# Patient Record
Sex: MALE | Race: WHITE | NOT HISPANIC OR LATINO | Employment: FULL TIME | URBAN - NONMETROPOLITAN AREA
[De-identification: names, ages, dates, MRNs, and addresses within clinical notes are randomized per-mention and may not be internally consistent; named-entity substitution may affect disease eponyms.]

---

## 2024-11-15 ENCOUNTER — OFFICE VISIT (OUTPATIENT)
Dept: INTERNAL MEDICINE | Facility: CLINIC | Age: 39
End: 2024-11-15

## 2024-11-15 VITALS
BODY MASS INDEX: 27.32 KG/M2 | TEMPERATURE: 98.7 F | WEIGHT: 164 LBS | OXYGEN SATURATION: 98 % | HEIGHT: 65 IN | DIASTOLIC BLOOD PRESSURE: 80 MMHG | HEART RATE: 81 BPM | SYSTOLIC BLOOD PRESSURE: 130 MMHG

## 2024-11-15 DIAGNOSIS — K40.90 LEFT INGUINAL HERNIA: Primary | ICD-10-CM

## 2024-11-15 NOTE — PROGRESS NOTES
"Chief Complaint   Patient presents with    Establish Care    Hernia     Patient reports hernia near groin area         History:  Basil Fuentes is a 39 y.o. male who presents today for evaluation of the above problems.      HPI  History of Present Illness  The patient presents for evaluation of a left-sided groin hernia.    He has been living with this hernia for several years and now wishes to address it. The hernia is large and he believes it can be manually reduced. He experiences mild pain and soreness, which varies depending on his activities. 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    He reports no pain in the testicle itself and has no other medical conditions. His decision to seek treatment at this time is due to financial considerations.  He was able to save up enough money to have the hernia treated.  He called to see a surgeon but was instructed to have a referral from his PCP.  He did not have a PCP and presented here to establish care.    SOCIAL HISTORY  He drinks alcohol occasionally.        Social History     Socioeconomic History    Marital status: Single   Tobacco Use    Smoking status: Former     Current packs/day: 0.00     Types: Cigarettes     Quit date:      Years since quittin.8    Smokeless tobacco: Former     Quit date:    Substance and Sexual Activity    Alcohol use: Yes     Comment: occas-2 drinks per week    Drug use: Never    Sexual activity: Defer       ROS:  Review of Systems  As above  No current outpatient medications on file.    No results found for: \"GLUCOSE\", \"BUN\", \"CREATININE\", \"NA\", \"K\", \"CL\", \"CALCIUM\", \"PROTEINTOT\", \"ALBUMIN\", \"ALT\", \"AST\", \"ALKPHOS\", \"BILITOT\", \"GLOB\", \"AGRATIO\", \"BCR\", \"ANIONGAP\", \"EGFR\"    No results found for: \"WBC\", \"RBC\", \"HGB\", \"HCT\", \"MCV\", \"MCH\", \"MCHC\", \"RDW\", \"RDWSD\", \"MPV\", \"PLT\", " "\"NEUTRORELPCT\", \"LYMPHORELPCT\", \"MONORELPCT\", \"EOSRELPCT\", \"BASORELPCT\", \"AUTOIGPER\", \"NEUTROABS\", \"LYMPHSABS\", \"MONOSABS\", \"EOSABS\", \"BASOSABS\", \"AUTOIGNUM\", \"NRBC\"      OBJECTIVE:  Visit Vitals  /80 (BP Location: Left arm, Patient Position: Sitting, Cuff Size: Adult)   Pulse 81   Temp 98.7 °F (37.1 °C) (Temporal)   Ht 165.1 cm (65\")   Wt 74.4 kg (164 lb)   SpO2 98%   BMI 27.29 kg/m²      Physical Exam  Constitutional:       General: He is not in acute distress.     Appearance: He is well-developed. He is not ill-appearing or toxic-appearing.   HENT:      Head: Normocephalic and atraumatic.   Eyes:      Pupils: Pupils are equal, round, and reactive to light.   Neck:      Thyroid: No thyromegaly.      Trachea: Phonation normal.   Cardiovascular:      Rate and Rhythm: Normal rate and regular rhythm.      Heart sounds: No murmur heard.  Pulmonary:      Effort: Pulmonary effort is normal. No respiratory distress.      Breath sounds: Normal breath sounds. No wheezing or rales.   Genitourinary:      Skin:     Coloration: Skin is not pale.      Findings: No erythema.   Neurological:      Mental Status: He is alert.   Psychiatric:         Behavior: Behavior normal.         Thought Content: Thought content normal.         Judgment: Judgment normal.           Assessment/Plan      Diagnoses and all orders for this visit:    1. Left inguinal hernia (Primary)  -     Ambulatory Referral to General Surgery      Assessment & Plan  1. Left inguinal hernia.  The hernia is notably large and extends into the left scrotum, but it is not causing significant pain. Over-the-counter pain relievers such as ibuprofen or Tylenol were suggested for managing discomfort. A referral to a surgeon was provided for further evaluation and potential surgical intervention.    He was instructed to look into getting insurance as he currently does not have any insurance coverage.    Will have him come back in around 6 months for a physical or sooner " if clinically indicated    Return in about 6 months (around 5/15/2025) for Annual physical.      CARLOS Bennett MD  13:59 CST  11/15/2024   Electronically signed      Patient or patient representative verbalized consent for the use of Ambient Listening during the visit with  ALONA Bennett MD for chart documentation. 11/15/2024  14:16 CST

## 2024-11-27 NOTE — PROGRESS NOTES
"  [facility] History and Physical  Basil Fuentes  MRN: [id number]  Age: 39 y.o. male     YOB: 1985    Primary   Problem      - Left groin bulge    SUBJECTIVE  History  of Presenting Illness   Patient is a 39 y.o. male with no pertinent past medical history, presenting for evaluation of left groin bulge. Recently saw Dr. Moore to establish care and have hernia evaluated, and was subsequently referred to our group for surgical evaluation. He is currently taking no prescription medications and he has never had surgery before.         Past Medical History     Past Medical History:  No pertinent past medical history.    PCP: ALONA Bennett MD    Past Surgical History:  No prior surgeries.    Family History:  Patient lost his parents when he was an adolescent, is unaware of details regarding family history.    Social History:  Social History     Tobacco Use    Smoking status: Former     Current packs/day: 0.00     Types: Cigarettes     Quit date:      Years since quittin.9    Smokeless tobacco: Former     Quit date:    Substance Use Topics    Alcohol use: Yes     Comment: occas-2 drinks per week       Allergies:  No Known Allergies    Medications:  No current outpatient medications        Review of Systems   He otherwise denies lightheadedness, dizziness, fainting, passing out, headache, nausea, vomiting, chest pain, palpitations, shortness of breath, coughing, wheezing, heart burn, reflux, skin lesions, unintended weight loss/gain, sensitivity to heat/cold, changes in sensation, changes in vision/hearing/smell/taste, myalgias, arthralgias, and has no other acute complaints or concerning symptoms to report at this time.      Physical Examination   There were no vitals filed for this visit.    Estimated body mass index is 27.29 kg/m² as calculated from the following:    Height as of 11/15/24: 165.1 cm (65\").    Weight as of 11/15/24: 74.4 kg (164 " lb).  PREVIOUS WEIGHTS:   Wt Readings from Last 5 Encounters:   11/15/24 74.4 kg (164 lb)       Physical Examination:  Gen: awake, alert, sitting up on table in no acute distress  HEENT: NCAT, EOMI, anicteric sclerae  CV: RRR, normotensive  Resp: nonlabored on room air, sats appropriate  Abd: soft, nontender, nondistended   : Large left groin bulge which descends into the left hemiscrotum; reducible when patient is supine, has tussive impulse; small tussive impulse on the right may have weakness of ring that location  MSK: moves all four, no c/c/e  Neuro: no focal deficits, cranial nerves grossly intact  Psy:  appropriate, cooperative      Data Review   No recent labs or imaging to review at this time.     Problem List     Patient Active Problem List   Diagnosis    Left inguinal hernia        Assessment/Plan   Basil is a 39 y.o. male with a left inguinal hernia who is desiring repair.  Given the size of the hernia, and the fact that it is symptomatic, I believe it would benefit from repair, and I discussed minimally invasive inguinal hernia repair with mesh in great detail with the patient.  I reviewed the risk, benefits, and alternative therapies in great detail, and I did recommend robotic hernia repair with mesh, possible bilaterally.  I discussed perioperative expectations in great detail, including a 15 pound lifting restriction for at least 6 weeks following surgery.  He indicates this will not be difficult with his employer.  Given that the patient has some issues with his employer, insurance status, would like to delay repair until after the holiday season.  He was provided with an after visit summary and our phone number, and he will give us a call when he wants to schedule an operation.    Smoking cessation: Former smoker - counseled to continue cessation  BMI is >= 25 and <30. (Overweight) The following options were offered after discussion;: exercise counseling/recommendations and nutrition  counseling/recommendations.  Was counseled to avoid weight gain.  Med rec complete: yes  VTE: VTE PPX is not indicated.    Time Spent: I spent 30 minutes caring for Basil Fuentes on this date of service. This time includes time, independent of any procedures, spent by me in the following activities: preparing for the clinic visit, reviewing tests, obtaining and/or reviewing a separately obtained history, performing a medically appropriate examination and/or evaluation, counseling and educating the patient/family/caregiver, ordering medications, tests, or procedures, and documenting information in the medical record.     Weston Martínez MD  11/27/2024   08:10 CST

## 2024-12-03 ENCOUNTER — OFFICE VISIT (OUTPATIENT)
Dept: SURGERY | Facility: CLINIC | Age: 39
End: 2024-12-03
Payer: MEDICAID

## 2024-12-03 VITALS
BODY MASS INDEX: 27.26 KG/M2 | DIASTOLIC BLOOD PRESSURE: 78 MMHG | SYSTOLIC BLOOD PRESSURE: 125 MMHG | HEIGHT: 66 IN | OXYGEN SATURATION: 98 % | WEIGHT: 169.6 LBS | HEART RATE: 66 BPM

## 2024-12-03 DIAGNOSIS — K40.90 LEFT INGUINAL HERNIA: Primary | ICD-10-CM

## 2024-12-03 NOTE — LETTER
December 3, 2024     ALONA Bennett MD  [address]  [address]  [place] [address]    Patient: Basil Fuentes   YOB: 1985   Date of Visit: 12/3/2024     Dear ALONA Bennett MD:       I have seen Basil Fuentes in my clinic today for evaluation of his left inguinal hernia.  Given the size of the hernia, and his symptoms, I do believe he would benefit from repair in a minimally invasive fashion.  I discussed this with him in great detail.he is going to try to work on his employment situation, as well as his insurance, and he will contact us to schedule surgery in the near future once these issues are resolved.     Should you have any questions or concerns about his care, please feel free to reach out.  Thank you very much for this referral, and for involving me in Basil's care.        Sincerely,        Weston Martínez MD        CC: Weston Yang MD  12/03/24 0843  Sign when Signing Visit    [facility] History and Physical  Basil Fuentes  MRN: [id number]  Age: 39 y.o. male     YOB: 1985    Primary   Problem      - Left groin bulge    SUBJECTIVE  History  of Presenting Illness   Patient is a 39 y.o. male with no pertinent past medical history, presenting for evaluation of left groin bulge. Recently saw Dr. Moore to establish care and have hernia evaluated, and was subsequently referred to our group for surgical evaluation. He is currently taking no prescription medications and he has never had surgery before.         Past Medical History     Past Medical History:  No pertinent past medical history.    PCP: ALONA Bennett MD    Past Surgical History:  No prior surgeries.    Family History:  Patient lost his parents when he was an adolescent, is unaware of details regarding family history.    Social History:  Social History     Tobacco Use   • Smoking status: Former     Current packs/day: 0.00      "Types: Cigarettes     Quit date: 2013     Years since quittin.9   • Smokeless tobacco: Former     Quit date: 2019   Substance Use Topics   • Alcohol use: Yes     Comment: occas-2 drinks per week       Allergies:  No Known Allergies    Medications:  No current outpatient medications        Review of Systems   He otherwise denies lightheadedness, dizziness, fainting, passing out, headache, nausea, vomiting, chest pain, palpitations, shortness of breath, coughing, wheezing, heart burn, reflux, skin lesions, unintended weight loss/gain, sensitivity to heat/cold, changes in sensation, changes in vision/hearing/smell/taste, myalgias, arthralgias, and has no other acute complaints or concerning symptoms to report at this time.      Physical Examination   There were no vitals filed for this visit.    Estimated body mass index is 27.29 kg/m² as calculated from the following:    Height as of 11/15/24: 165.1 cm (65\").    Weight as of 11/15/24: 74.4 kg (164 lb).  PREVIOUS WEIGHTS:   Wt Readings from Last 5 Encounters:   11/15/24 74.4 kg (164 lb)       Physical Examination:  Gen: awake, alert, sitting up on table in no acute distress  HEENT: NCAT, EOMI, anicteric sclerae  CV: RRR, normotensive  Resp: nonlabored on room air, sats appropriate  Abd: soft, nontender, nondistended   : Large left groin bulge which descends into the left hemiscrotum; reducible when patient is supine, has tussive impulse; small tussive impulse on the right may have weakness of ring that location  MSK: moves all four, no c/c/e  Neuro: no focal deficits, cranial nerves grossly intact  Psy:  appropriate, cooperative      Data Review   No recent labs or imaging to review at this time.     Problem List     Patient Active Problem List   Diagnosis   • Left inguinal hernia        Assessment/Plan   Basil is a 39 y.o. male with a left inguinal hernia who is desiring repair.  Given the size of the hernia, and the fact that it is symptomatic, I believe it " would benefit from repair, and I discussed minimally invasive inguinal hernia repair with mesh in great detail with the patient.  I reviewed the risk, benefits, and alternative therapies in great detail, and I did recommend robotic hernia repair with mesh, possible bilaterally.  I discussed perioperative expectations in great detail, including a 15 pound lifting restriction for at least 6 weeks following surgery.  He indicates this will not be difficult with his employer.  Given that the patient has some issues with his employer, insurance status, would like to delay repair until after the holiday season.  He was provided with an after visit summary and our phone number, and he will give us a call when he wants to schedule an operation.    Smoking cessation: Former smoker - counseled to continue cessation  BMI is >= 25 and <30. (Overweight) The following options were offered after discussion;: exercise counseling/recommendations and nutrition counseling/recommendations.  Was counseled to avoid weight gain.  Med rec complete: yes  VTE: VTE PPX is not indicated.    Time Spent: I spent 30 minutes caring for Basil Fuentes on this date of service. This time includes time, independent of any procedures, spent by me in the following activities: preparing for the clinic visit, reviewing tests, obtaining and/or reviewing a separately obtained history, performing a medically appropriate examination and/or evaluation, counseling and educating the patient/family/caregiver, ordering medications, tests, or procedures, and documenting information in the medical record.     Weston Martínez MD  11/27/2024   08:10 CST

## 2024-12-03 NOTE — PATIENT INSTRUCTIONS
General Surgery Clinic Discharge Instructions      Basil Alfredo  12/03/24      Diagnosis: left inguinal hernia    Medications/Treatments:   (Take all medications as prescribed by your provider)  N/A      Surgical Plan: Plan for robotic left inguinal hernia repair with mesh, possible bilateral, in near future.       Follow-up: Please call the office to schedule surgery for a date that works for you.  I generally do my robotic cases on Friday afternoons, and any date on a Friday afternoon will work.      Please call our office at [phone] with any issues, questions, or concerns.      Weston Martínez MD  12/3/2024   08:37 CST

## 2024-12-16 DIAGNOSIS — K40.90 LEFT INGUINAL HERNIA: Primary | ICD-10-CM

## 2025-01-16 ENCOUNTER — HOSPITAL ENCOUNTER (OUTPATIENT)
Dept: CT IMAGING | Facility: HOSPITAL | Age: 40
Discharge: HOME | End: 2025-01-16
Admitting: STUDENT IN AN ORGANIZED HEALTH CARE EDUCATION/TRAINING PROGRAM

## 2025-01-16 DIAGNOSIS — K40.90 LEFT INGUINAL HERNIA: ICD-10-CM

## 2025-01-16 PROCEDURE — 25510000001 IOPAMIDOL 61 % SOLUTION: Performed by: STUDENT IN AN ORGANIZED HEALTH CARE EDUCATION/TRAINING PROGRAM

## 2025-01-16 PROCEDURE — 74177 CT ABD & PELVIS W/CONTRAST: CPT

## 2025-01-16 RX ORDER — IOPAMIDOL 612 MG/ML
100 INJECTION, SOLUTION INTRAVASCULAR
Status: COMPLETED | OUTPATIENT
Start: 2025-01-16 | End: 2025-01-16

## 2025-01-16 RX ADMIN — IOPAMIDOL 100 ML: 612 INJECTION, SOLUTION INTRAVENOUS at 08:23

## 2025-01-24 ENCOUNTER — PRE-ADMISSION TESTING (OUTPATIENT)
Dept: PREADMISSION TESTING | Facility: HOSPITAL | Age: 40
End: 2025-01-24

## 2025-01-24 ENCOUNTER — HOSPITAL ENCOUNTER (OUTPATIENT)
Dept: GENERAL RADIOLOGY | Facility: HOSPITAL | Age: 40
Discharge: HOME | End: 2025-01-24

## 2025-01-24 VITALS
DIASTOLIC BLOOD PRESSURE: 84 MMHG | WEIGHT: 173.72 LBS | RESPIRATION RATE: 18 BRPM | HEART RATE: 71 BPM | BODY MASS INDEX: 27.27 KG/M2 | HEIGHT: 67 IN | OXYGEN SATURATION: 99 % | SYSTOLIC BLOOD PRESSURE: 120 MMHG

## 2025-01-24 DIAGNOSIS — K40.90 LEFT INGUINAL HERNIA: ICD-10-CM

## 2025-01-24 LAB
ALBUMIN SERPL-MCNC: 4.3 G/DL (ref 3.5–5.2)
ALBUMIN/GLOB SERPL: 1.7 G/DL
ALP SERPL-CCNC: 64 U/L (ref 39–117)
ALT SERPL W P-5'-P-CCNC: 34 U/L (ref 1–41)
ANION GAP SERPL CALCULATED.3IONS-SCNC: 8 MMOL/L (ref 5–15)
APTT PPP: 24.9 SECONDS (ref 24.5–36)
AST SERPL-CCNC: 25 U/L (ref 1–40)
BASOPHILS # BLD AUTO: 0.06 10*3/MM3 (ref 0–0.2)
BASOPHILS NFR BLD AUTO: 1.4 % (ref 0–1.5)
BILIRUB SERPL-MCNC: 0.3 MG/DL (ref 0–1.2)
BUN SERPL-MCNC: 19 MG/DL (ref 6–20)
BUN/CREAT SERPL: 25.3 (ref 7–25)
CALCIUM SPEC-SCNC: 9 MG/DL (ref 8.6–10.5)
CHLORIDE SERPL-SCNC: 105 MMOL/L (ref 98–107)
CO2 SERPL-SCNC: 26 MMOL/L (ref 22–29)
CREAT SERPL-MCNC: 0.75 MG/DL (ref 0.76–1.27)
DEPRECATED RDW RBC AUTO: 42.3 FL (ref 37–54)
EGFRCR SERPLBLD CKD-EPI 2021: 117.7 ML/MIN/1.73
EOSINOPHIL # BLD AUTO: 0.1 10*3/MM3 (ref 0–0.4)
EOSINOPHIL NFR BLD AUTO: 2.3 % (ref 0.3–6.2)
ERYTHROCYTE [DISTWIDTH] IN BLOOD BY AUTOMATED COUNT: 13.4 % (ref 12.3–15.4)
GLOBULIN UR ELPH-MCNC: 2.6 GM/DL
GLUCOSE SERPL-MCNC: 93 MG/DL (ref 65–99)
HCT VFR BLD AUTO: 43.1 % (ref 37.5–51)
HGB BLD-MCNC: 14.1 G/DL (ref 13–17.7)
IMM GRANULOCYTES # BLD AUTO: 0.01 10*3/MM3 (ref 0–0.05)
IMM GRANULOCYTES NFR BLD AUTO: 0.2 % (ref 0–0.5)
INR PPP: 0.93 (ref 0.91–1.09)
LYMPHOCYTES # BLD AUTO: 1.28 10*3/MM3 (ref 0.7–3.1)
LYMPHOCYTES NFR BLD AUTO: 29.7 % (ref 19.6–45.3)
MCH RBC QN AUTO: 28 PG (ref 26.6–33)
MCHC RBC AUTO-ENTMCNC: 32.7 G/DL (ref 31.5–35.7)
MCV RBC AUTO: 85.7 FL (ref 79–97)
MONOCYTES # BLD AUTO: 0.35 10*3/MM3 (ref 0.1–0.9)
MONOCYTES NFR BLD AUTO: 8.1 % (ref 5–12)
NEUTROPHILS NFR BLD AUTO: 2.51 10*3/MM3 (ref 1.7–7)
NEUTROPHILS NFR BLD AUTO: 58.3 % (ref 42.7–76)
NRBC BLD AUTO-RTO: 0 /100 WBC (ref 0–0.2)
PLATELET # BLD AUTO: 255 10*3/MM3 (ref 140–450)
PMV BLD AUTO: 10 FL (ref 6–12)
POTASSIUM SERPL-SCNC: 4.3 MMOL/L (ref 3.5–5.2)
PROT SERPL-MCNC: 6.9 G/DL (ref 6–8.5)
PROTHROMBIN TIME: 12.9 SECONDS (ref 11.8–14.8)
QT INTERVAL: 382 MS
QTC INTERVAL: 406 MS
RBC # BLD AUTO: 5.03 10*6/MM3 (ref 4.14–5.8)
SODIUM SERPL-SCNC: 139 MMOL/L (ref 136–145)
WBC NRBC COR # BLD AUTO: 4.31 10*3/MM3 (ref 3.4–10.8)

## 2025-01-24 PROCEDURE — 80053 COMPREHEN METABOLIC PANEL: CPT

## 2025-01-24 PROCEDURE — 85025 COMPLETE CBC W/AUTO DIFF WBC: CPT

## 2025-01-24 PROCEDURE — 71045 X-RAY EXAM CHEST 1 VIEW: CPT

## 2025-01-24 PROCEDURE — 85730 THROMBOPLASTIN TIME PARTIAL: CPT

## 2025-01-24 PROCEDURE — 36415 COLL VENOUS BLD VENIPUNCTURE: CPT

## 2025-01-24 PROCEDURE — 85610 PROTHROMBIN TIME: CPT

## 2025-01-24 PROCEDURE — 93005 ELECTROCARDIOGRAM TRACING: CPT

## 2025-01-24 NOTE — DISCHARGE INSTRUCTIONS
Preparing for Surgery  Follow these instructions before the procedure:  Several days or weeks before your procedure      Ask your health care provider about:  Changing or stopping your regular medicines. This is especially important if you are taking diabetes medicines or blood thinners.  Taking medicines such as aspirin and ibuprofen. These medicines can thin your blood. Do not take these medicines unless your health care provider tells you to take them.  Taking over-the-counter medicines, vitamins, herbs, and supplements.    Contact your surgeon if you:  Develop a fever of more than 100.4°F (38°C) or other feelings of illness during the 48 hours before your surgery.  Have symptoms that get worse.  Have questions or concerns about your surgery.  If you are going home the same day of your surgery you will need to arrange for a responsible adult, age 18 years old or older, to drive you home from the hospital and stay with you for 24 hours. Verification of the  will be made prior to any procedure requiring sedation. You may not go home in a taxi or any form of public transportation by yourself.     Day before your procedure    24 hours before your procedure DO NOT drink alcoholic beverages or smoke.  24 hours before your procedure STOP taking Erectile Dysfunction medication (i.e.,Cialis, Viagra)   You may be asked to shower with a germ-killing soap.  Day of your procedure       8 hours before your scheduled arrival time, STOP all food, any dairy products, and full liquids. This includes hard candy, chewing gum or mints. This is extremely important to prevent serious complications.     Up to 2 hours before your scheduled arrival time, you may have clear liquids no cream, powder, or pulp of any kind. Safe options are water, black coffee, plain tea, soda, Gatorade/Powerade, clear broth, apple juice.    2 hours before your scheduled arrival time, STOP drinking clear liquids.    You may need to take another shower  with a germ-killing soap before you leave home in the morning. Do not use perfumes, colognes, or body lotions.  Wear comfortable loose-fitting clothing.  Remove all jewelry including body piercing and rings, dark colored nail polish, and make up prior to arrival at the hospital. Leave all valuables at home.   Bring your hearing aids if you rely on them.  Do not wear contact lenses. If you wear eyeglasses remember to bring a case to store them in while you are in surgery.  Do not use denture adhesives since you will be asked to remove them during your surgery.    You do not need to bring your home medications into the hospital.   Bring your sleep apnea device with you on the day of your surgery (if this applies to you).  If you have an Inspire implant for sleep apnea, please bring the remote with you on the day of surgery.  If you wear portable oxygen, bring it with you.   If you are staying overnight, you may bring a bag of items you may need such as slippers, robe and a change of clothes for your discharge. You may want to leave these items in the car until you are ready for them since your family will take your belongings when you leave the pre-operative area.  Arrive at the hospital as scheduled by the office. You will be asked to arrive 2 hours prior to your surgery time in order to prepare for your procedure.  When you arrive at the hospital  Go to the registration desk located at the main entrance of the hospital.  After registration is completed, you will be given a beeper and a sticker sheet. Take the stickers to Outpatient Surgery and place in the tray at the end of the desk to notify the staff that you have arrived and registered.   Return to the lobby to wait. You are not always called back according to the time of arrival but rather the time your doctor will be ready.  When your beeper lights up and vibrates proceed through the double doors, under the stairs, and a member of the Outpatient Surgery staff  will escort you to your preoperative room.               How to Use Chlorhexidine Before Surgery  Chlorhexidine gluconate (CHG) is a germ-killing (antiseptic) solution that is used to clean the skin. It can get rid of the bacteria that normally live on the skin and can keep them away for about 24 hours. To clean your skin with CHG, you may be given:  A CHG solution to use in the shower or as part of a sponge bath.  A prepackaged cloth that contains CHG.  Cleaning your skin with CHG may help lower the risk for infection:  While you are staying in the intensive care unit of the hospital.  If you have a vascular access, such as a central line, to provide short-term or long-term access to your veins.  If you have a catheter to drain urine from your bladder.  If you are on a ventilator. A ventilator is a machine that helps you breathe by moving air in and out of your lungs.  After surgery.  What are the risks?  Risks of using CHG include:  A skin reaction.  Hearing loss, if CHG gets in your ears and you have a perforated eardrum.  Eye injury, if CHG gets in your eyes and is not rinsed out.  The CHG product catching fire.  Make sure that you avoid smoking and flames after applying CHG to your skin.  Do not use CHG:  If you have a chlorhexidine allergy or have previously reacted to chlorhexidine.  On babies younger than 2 months of age.  How to use CHG solution  Use CHG only as told by your health care provider, and follow the instructions on the label.  Use the full amount of CHG as directed. Usually, this is one bottle.  During a shower    Follow these steps when using CHG solution during a shower (unless your health care provider gives you different instructions):  Start the shower.  Use your normal soap and shampoo to wash your face and hair.  Turn off the shower or move out of the shower stream.  Pour the CHG onto a clean washcloth. Do not use any type of brush or rough-edged sponge.  Starting at your neck, lather  your body down to your toes. Make sure you follow these instructions:  If you will be having surgery, pay special attention to the part of your body where you will be having surgery. Scrub this area for at least 1 minute.  Do not use CHG on your head or face. If the solution gets into your ears or eyes, rinse them well with water.  Avoid your genital area.  Avoid any areas of skin that have broken skin, cuts, or scrapes.  Scrub your back and under your arms. Make sure to wash skin folds.  Let the lather sit on your skin for 1-2 minutes or as long as told by your health care provider.  Thoroughly rinse your entire body in the shower. Make sure that all body creases and crevices are rinsed well.  Dry off with a clean towel. Do not put any substances on your body afterward--such as powder, lotion, or perfume--unless you are told to do so by your health care provider. Only use lotions that are recommended by the .  Put on clean clothes or pajamas.  If it is the night before your surgery, sleep in clean sheets.     During a sponge bath  Follow these steps when using CHG solution during a sponge bath (unless your health care provider gives you different instructions):  Use your normal soap and shampoo to wash your face and hair.  Pour the CHG onto a clean washcloth.  Starting at your neck, lather your body down to your toes. Make sure you follow these instructions:  If you will be having surgery, pay special attention to the part of your body where you will be having surgery. Scrub this area for at least 1 minute.  Do not use CHG on your head or face. If the solution gets into your ears or eyes, rinse them well with water.  Avoid your genital area.  Avoid any areas of skin that have broken skin, cuts, or scrapes.  Scrub your back and under your arms. Make sure to wash skin folds.  Let the lather sit on your skin for 1-2 minutes or as long as told by your health care provider.  Using a different clean, wet  washcloth, thoroughly rinse your entire body. Make sure that all body creases and crevices are rinsed well.  Dry off with a clean towel. Do not put any substances on your body afterward--such as powder, lotion, or perfume--unless you are told to do so by your health care provider. Only use lotions that are recommended by the .  Put on clean clothes or pajamas.  If it is the night before your surgery, sleep in clean sheets.  How to use CHG prepackaged cloths  Only use CHG cloths as told by your health care provider, and follow the instructions on the label.  Use the CHG cloth on clean, dry skin.  Do not use the CHG cloth on your head or face unless your health care provider tells you to.  When washing with the CHG cloth:  Avoid your genital area.  Avoid any areas of skin that have broken skin, cuts, or scrapes.  Before surgery    Follow these steps when using a CHG cloth to clean before surgery (unless your health care provider gives you different instructions):  Using the CHG cloth, vigorously scrub the part of your body where you will be having surgery. Scrub using a back-and-forth motion for 3 minutes. The area on your body should be completely wet with CHG when you are done scrubbing.  Do not rinse. Discard the cloth and let the area air-dry. Do not put any substances on the area afterward, such as powder, lotion, or perfume.  Put on clean clothes or pajamas.  If it is the night before your surgery, sleep in clean sheets.     For general bathing  Follow these steps when using CHG cloths for general bathing (unless your health care provider gives you different instructions).  Use a separate CHG cloth for each area of your body. Make sure you wash between any folds of skin and between your fingers and toes. Wash your body in the following order, switching to a new cloth after each step:  The front of your neck, shoulders, and chest.  Both of your arms, under your arms, and your hands.  Your stomach and  groin area, avoiding the genitals.  Your right leg and foot.  Your left leg and foot.  The back of your neck, your back, and your buttocks.  Do not rinse. Discard the cloth and let the area air-dry. Do not put any substances on your body afterward--such as powder, lotion, or perfume--unless you are told to do so by your health care provider. Only use lotions that are recommended by the .  Put on clean clothes or pajamas.  Contact a health care provider if:  Your skin gets irritated after scrubbing.  You have questions about using your solution or cloth.  You swallow any chlorhexidine. Call your local poison control center (1-479.840.8266 in the [place]  Get help right away if:  Your eyes itch badly, or they become very red or swollen.  Your skin itches badly and is red or swollen.  Your hearing changes.  You have trouble seeing.  You have swelling or tingling in your mouth or throat.  You have trouble breathing.  These symptoms may represent a serious problem that is an emergency. Do not wait to see if the symptoms will go away. Get medical help right away. Call your local emergency services (741 in the [place] Do not drive yourself to the hospital.  Summary  Chlorhexidine gluconate (CHG) is a germ-killing (antiseptic) solution that is used to clean the skin. Cleaning your skin with CHG may help to lower your risk for infection.  You may be given CHG to use for bathing. It may be in a bottle or in a prepackaged cloth to use on your skin. Carefully follow your health care provider's instructions and the instructions on the product label.  Do not use CHG if you have a chlorhexidine allergy.  Contact your health care provider if your skin gets irritated after scrubbing.  This information is not intended to replace advice given to you by your health care provider. Make sure you discuss any questions you have with your health care provider.  Document Revised: 04/17/2023 Document Reviewed: 02/28/2022  Roger  Patient Education © 2023 [employer]

## 2025-01-31 ENCOUNTER — ANESTHESIA EVENT (OUTPATIENT)
Dept: PERIOP | Facility: HOSPITAL | Age: 40
End: 2025-01-31

## 2025-01-31 ENCOUNTER — ANESTHESIA (OUTPATIENT)
Dept: PERIOP | Facility: HOSPITAL | Age: 40
End: 2025-01-31

## 2025-01-31 ENCOUNTER — HOSPITAL ENCOUNTER (OUTPATIENT)
Facility: HOSPITAL | Age: 40
Setting detail: HOSPITAL OUTPATIENT SURGERY
Discharge: HOME | End: 2025-01-31
Attending: STUDENT IN AN ORGANIZED HEALTH CARE EDUCATION/TRAINING PROGRAM | Admitting: STUDENT IN AN ORGANIZED HEALTH CARE EDUCATION/TRAINING PROGRAM

## 2025-01-31 VITALS
RESPIRATION RATE: 16 BRPM | SYSTOLIC BLOOD PRESSURE: 137 MMHG | DIASTOLIC BLOOD PRESSURE: 75 MMHG | HEART RATE: 61 BPM | OXYGEN SATURATION: 97 % | TEMPERATURE: 97.9 F

## 2025-01-31 DIAGNOSIS — K40.90 LEFT INGUINAL HERNIA: ICD-10-CM

## 2025-01-31 PROCEDURE — 25010000002 DEXAMETHASONE PER 1 MG: Performed by: NURSE ANESTHETIST, CERTIFIED REGISTERED

## 2025-01-31 PROCEDURE — 25010000002 MORPHINE SULFATE (PF) 2 MG/ML SOLUTION 1 ML CARTRIDGE: Performed by: STUDENT IN AN ORGANIZED HEALTH CARE EDUCATION/TRAINING PROGRAM

## 2025-01-31 PROCEDURE — S0260 H&P FOR SURGERY: HCPCS | Performed by: STUDENT IN AN ORGANIZED HEALTH CARE EDUCATION/TRAINING PROGRAM

## 2025-01-31 PROCEDURE — 25010000002 MIDAZOLAM PER 1MG: Performed by: ANESTHESIOLOGY

## 2025-01-31 PROCEDURE — 25010000002 GLYCOPYRROLATE 0.4 MG/2ML SOLUTION: Performed by: NURSE ANESTHETIST, CERTIFIED REGISTERED

## 2025-01-31 PROCEDURE — 25010000002 LIDOCAINE 1% - EPINEPHRINE 1:100000 1 %-1:100000 SOLUTION 20 ML VIAL: Performed by: STUDENT IN AN ORGANIZED HEALTH CARE EDUCATION/TRAINING PROGRAM

## 2025-01-31 PROCEDURE — 25010000002 HYDROMORPHONE 1 MG/ML SOLUTION: Performed by: NURSE ANESTHETIST, CERTIFIED REGISTERED

## 2025-01-31 PROCEDURE — 49650 LAP ING HERNIA REPAIR INIT: CPT | Performed by: STUDENT IN AN ORGANIZED HEALTH CARE EDUCATION/TRAINING PROGRAM

## 2025-01-31 PROCEDURE — S2900 ROBOTIC SURGICAL SYSTEM: HCPCS | Performed by: STUDENT IN AN ORGANIZED HEALTH CARE EDUCATION/TRAINING PROGRAM

## 2025-01-31 PROCEDURE — C1781 MESH (IMPLANTABLE): HCPCS | Performed by: STUDENT IN AN ORGANIZED HEALTH CARE EDUCATION/TRAINING PROGRAM

## 2025-01-31 PROCEDURE — 25010000002 BUPIVACAINE 0.5 % SOLUTION 50 ML VIAL: Performed by: STUDENT IN AN ORGANIZED HEALTH CARE EDUCATION/TRAINING PROGRAM

## 2025-01-31 PROCEDURE — 25010000002 BUPIVACAINE (PF) 0.25 % SOLUTION 30 ML VIAL: Performed by: STUDENT IN AN ORGANIZED HEALTH CARE EDUCATION/TRAINING PROGRAM

## 2025-01-31 PROCEDURE — 25010000002 LIDOCAINE 1 % SOLUTION 20 ML VIAL: Performed by: STUDENT IN AN ORGANIZED HEALTH CARE EDUCATION/TRAINING PROGRAM

## 2025-01-31 PROCEDURE — 25010000002 ONDANSETRON PER 1 MG: Performed by: NURSE ANESTHETIST, CERTIFIED REGISTERED

## 2025-01-31 PROCEDURE — 25010000002 DEXAMETHASONE PER 1 MG: Performed by: STUDENT IN AN ORGANIZED HEALTH CARE EDUCATION/TRAINING PROGRAM

## 2025-01-31 PROCEDURE — 25810000003 LACTATED RINGERS PER 1000 ML: Performed by: STUDENT IN AN ORGANIZED HEALTH CARE EDUCATION/TRAINING PROGRAM

## 2025-01-31 PROCEDURE — 25010000002 CEFAZOLIN PER 500 MG: Performed by: STUDENT IN AN ORGANIZED HEALTH CARE EDUCATION/TRAINING PROGRAM

## 2025-01-31 PROCEDURE — 25010000002 FENTANYL CITRATE (PF) 100 MCG/2ML SOLUTION: Performed by: NURSE ANESTHETIST, CERTIFIED REGISTERED

## 2025-01-31 PROCEDURE — 25010000002 PROPOFOL 10 MG/ML EMULSION: Performed by: NURSE ANESTHETIST, CERTIFIED REGISTERED

## 2025-01-31 PROCEDURE — 25010000002 LIDOCAINE PF 2% 2 % SOLUTION: Performed by: NURSE ANESTHETIST, CERTIFIED REGISTERED

## 2025-01-31 DEVICE — IMPLANTABLE DEVICE: Type: IMPLANTABLE DEVICE | Site: ABDOMEN | Status: FUNCTIONAL

## 2025-01-31 DEVICE — ABSORBABLE WOUND CLOSURE DEVICE
Type: IMPLANTABLE DEVICE | Site: ABDOMEN | Status: FUNCTIONAL
Brand: V-LOC 90

## 2025-01-31 RX ORDER — PROPOFOL 10 MG/ML
VIAL (ML) INTRAVENOUS AS NEEDED
Status: DISCONTINUED | OUTPATIENT
Start: 2025-01-31 | End: 2025-01-31 | Stop reason: SURG

## 2025-01-31 RX ORDER — NEOSTIGMINE METHYLSULFATE 5 MG/5 ML
SYRINGE (ML) INTRAVENOUS AS NEEDED
Status: DISCONTINUED | OUTPATIENT
Start: 2025-01-31 | End: 2025-01-31 | Stop reason: SURG

## 2025-01-31 RX ORDER — SODIUM CHLORIDE 0.9 % (FLUSH) 0.9 %
3-10 SYRINGE (ML) INJECTION AS NEEDED
Status: DISCONTINUED | OUTPATIENT
Start: 2025-01-31 | End: 2025-01-31 | Stop reason: HOSPADM

## 2025-01-31 RX ORDER — ACETAMINOPHEN 500 MG
1000 TABLET ORAL ONCE
Status: COMPLETED | OUTPATIENT
Start: 2025-01-31 | End: 2025-01-31

## 2025-01-31 RX ORDER — CEPHALEXIN 500 MG/1
500 CAPSULE ORAL 4 TIMES DAILY
COMMUNITY

## 2025-01-31 RX ORDER — DOCUSATE SODIUM 250 MG
250 CAPSULE ORAL 2 TIMES DAILY PRN
Qty: 90 CAPSULE | Refills: 0 | Status: SHIPPED | OUTPATIENT
Start: 2025-01-31

## 2025-01-31 RX ORDER — ONDANSETRON 2 MG/ML
4 INJECTION INTRAMUSCULAR; INTRAVENOUS ONCE AS NEEDED
Status: DISCONTINUED | OUTPATIENT
Start: 2025-01-31 | End: 2025-01-31 | Stop reason: HOSPADM

## 2025-01-31 RX ORDER — MAGNESIUM HYDROXIDE 1200 MG/15ML
LIQUID ORAL AS NEEDED
Status: DISCONTINUED | OUTPATIENT
Start: 2025-01-31 | End: 2025-01-31 | Stop reason: HOSPADM

## 2025-01-31 RX ORDER — IBUPROFEN 600 MG/1
600 TABLET, FILM COATED ORAL EVERY 6 HOURS PRN
Status: DISCONTINUED | OUTPATIENT
Start: 2025-01-31 | End: 2025-01-31 | Stop reason: HOSPADM

## 2025-01-31 RX ORDER — FLUMAZENIL 0.1 MG/ML
0.2 INJECTION INTRAVENOUS AS NEEDED
Status: DISCONTINUED | OUTPATIENT
Start: 2025-01-31 | End: 2025-01-31 | Stop reason: HOSPADM

## 2025-01-31 RX ORDER — HYDROCODONE BITARTRATE AND ACETAMINOPHEN 7.5; 325 MG/1; MG/1
1 TABLET ORAL ONCE AS NEEDED
Status: DISCONTINUED | OUTPATIENT
Start: 2025-01-31 | End: 2025-01-31 | Stop reason: HOSPADM

## 2025-01-31 RX ORDER — DEXAMETHASONE SODIUM PHOSPHATE 4 MG/ML
INJECTION, SOLUTION INTRA-ARTICULAR; INTRALESIONAL; INTRAMUSCULAR; INTRAVENOUS; SOFT TISSUE AS NEEDED
Status: DISCONTINUED | OUTPATIENT
Start: 2025-01-31 | End: 2025-01-31 | Stop reason: SURG

## 2025-01-31 RX ORDER — FENTANYL CITRATE 50 UG/ML
50 INJECTION, SOLUTION INTRAMUSCULAR; INTRAVENOUS
Status: DISCONTINUED | OUTPATIENT
Start: 2025-01-31 | End: 2025-01-31 | Stop reason: HOSPADM

## 2025-01-31 RX ORDER — NALOXONE HCL 0.4 MG/ML
0.4 VIAL (ML) INJECTION AS NEEDED
Status: DISCONTINUED | OUTPATIENT
Start: 2025-01-31 | End: 2025-01-31 | Stop reason: HOSPADM

## 2025-01-31 RX ORDER — SODIUM CHLORIDE 0.9 % (FLUSH) 0.9 %
10 SYRINGE (ML) INJECTION AS NEEDED
Status: DISCONTINUED | OUTPATIENT
Start: 2025-01-31 | End: 2025-01-31 | Stop reason: HOSPADM

## 2025-01-31 RX ORDER — ROCURONIUM BROMIDE 10 MG/ML
INJECTION, SOLUTION INTRAVENOUS AS NEEDED
Status: DISCONTINUED | OUTPATIENT
Start: 2025-01-31 | End: 2025-01-31 | Stop reason: SURG

## 2025-01-31 RX ORDER — OXYCODONE HYDROCHLORIDE 5 MG/1
5 TABLET ORAL EVERY 6 HOURS PRN
Qty: 20 TABLET | Refills: 0 | Status: SHIPPED | OUTPATIENT
Start: 2025-01-31

## 2025-01-31 RX ORDER — SODIUM CHLORIDE 0.9 % (FLUSH) 0.9 %
10 SYRINGE (ML) INJECTION EVERY 12 HOURS SCHEDULED
Status: DISCONTINUED | OUTPATIENT
Start: 2025-01-31 | End: 2025-01-31 | Stop reason: HOSPADM

## 2025-01-31 RX ORDER — HYDROCODONE BITARTRATE AND ACETAMINOPHEN 5; 325 MG/1; MG/1
1 TABLET ORAL EVERY 4 HOURS PRN
Status: DISCONTINUED | OUTPATIENT
Start: 2025-01-31 | End: 2025-01-31 | Stop reason: HOSPADM

## 2025-01-31 RX ORDER — SODIUM CHLORIDE, SODIUM LACTATE, POTASSIUM CHLORIDE, CALCIUM CHLORIDE 600; 310; 30; 20 MG/100ML; MG/100ML; MG/100ML; MG/100ML
50 INJECTION, SOLUTION INTRAVENOUS CONTINUOUS
Status: DISCONTINUED | OUTPATIENT
Start: 2025-01-31 | End: 2025-01-31 | Stop reason: HOSPADM

## 2025-01-31 RX ORDER — MIDAZOLAM HYDROCHLORIDE 2 MG/2ML
1 INJECTION, SOLUTION INTRAMUSCULAR; INTRAVENOUS
Status: DISCONTINUED | OUTPATIENT
Start: 2025-01-31 | End: 2025-01-31 | Stop reason: HOSPADM

## 2025-01-31 RX ORDER — HYDROCODONE BITARTRATE AND ACETAMINOPHEN 10; 325 MG/1; MG/1
1 TABLET ORAL EVERY 4 HOURS PRN
Status: DISCONTINUED | OUTPATIENT
Start: 2025-01-31 | End: 2025-01-31 | Stop reason: HOSPADM

## 2025-01-31 RX ORDER — SODIUM CHLORIDE 9 MG/ML
40 INJECTION, SOLUTION INTRAVENOUS AS NEEDED
Status: DISCONTINUED | OUTPATIENT
Start: 2025-01-31 | End: 2025-01-31 | Stop reason: HOSPADM

## 2025-01-31 RX ORDER — SUCCINYLCHOLINE/SOD CL,ISO/PF 200MG/10ML
SYRINGE (ML) INTRAVENOUS AS NEEDED
Status: DISCONTINUED | OUTPATIENT
Start: 2025-01-31 | End: 2025-01-31 | Stop reason: SURG

## 2025-01-31 RX ORDER — ONDANSETRON 4 MG/1
4 TABLET, ORALLY DISINTEGRATING ORAL EVERY 8 HOURS PRN
Qty: 5 TABLET | Refills: 0 | Status: SHIPPED | OUTPATIENT
Start: 2025-01-31

## 2025-01-31 RX ORDER — ONDANSETRON 2 MG/ML
INJECTION INTRAMUSCULAR; INTRAVENOUS AS NEEDED
Status: DISCONTINUED | OUTPATIENT
Start: 2025-01-31 | End: 2025-01-31 | Stop reason: SURG

## 2025-01-31 RX ORDER — SODIUM CHLORIDE, SODIUM LACTATE, POTASSIUM CHLORIDE, CALCIUM CHLORIDE 600; 310; 30; 20 MG/100ML; MG/100ML; MG/100ML; MG/100ML
100 INJECTION, SOLUTION INTRAVENOUS CONTINUOUS
Status: DISCONTINUED | OUTPATIENT
Start: 2025-01-31 | End: 2025-01-31 | Stop reason: HOSPADM

## 2025-01-31 RX ORDER — METHYLPREDNISOLONE 4 MG/1
TABLET ORAL 2 TIMES DAILY
COMMUNITY

## 2025-01-31 RX ORDER — SODIUM CHLORIDE 0.9 % (FLUSH) 0.9 %
3 SYRINGE (ML) INJECTION EVERY 12 HOURS SCHEDULED
Status: DISCONTINUED | OUTPATIENT
Start: 2025-01-31 | End: 2025-01-31 | Stop reason: HOSPADM

## 2025-01-31 RX ORDER — FENTANYL CITRATE 50 UG/ML
INJECTION, SOLUTION INTRAMUSCULAR; INTRAVENOUS AS NEEDED
Status: DISCONTINUED | OUTPATIENT
Start: 2025-01-31 | End: 2025-01-31 | Stop reason: SURG

## 2025-01-31 RX ORDER — LIDOCAINE HYDROCHLORIDE 20 MG/ML
INJECTION, SOLUTION EPIDURAL; INFILTRATION; INTRACAUDAL; PERINEURAL AS NEEDED
Status: DISCONTINUED | OUTPATIENT
Start: 2025-01-31 | End: 2025-01-31 | Stop reason: SURG

## 2025-01-31 RX ORDER — GLYCOPYRROLATE 0.2 MG/ML
INJECTION INTRAMUSCULAR; INTRAVENOUS AS NEEDED
Status: DISCONTINUED | OUTPATIENT
Start: 2025-01-31 | End: 2025-01-31 | Stop reason: SURG

## 2025-01-31 RX ORDER — LABETALOL HYDROCHLORIDE 5 MG/ML
5 INJECTION, SOLUTION INTRAVENOUS
Status: DISCONTINUED | OUTPATIENT
Start: 2025-01-31 | End: 2025-01-31 | Stop reason: HOSPADM

## 2025-01-31 RX ADMIN — ROCURONIUM BROMIDE 40 MG: 10 INJECTION INTRAVENOUS at 12:20

## 2025-01-31 RX ADMIN — GLYCOPYRROLATE 0.4 MG: 0.2 INJECTION INTRAMUSCULAR; INTRAVENOUS at 15:37

## 2025-01-31 RX ADMIN — ROCURONIUM BROMIDE 20 MG: 10 INJECTION INTRAVENOUS at 14:00

## 2025-01-31 RX ADMIN — Medication 3 MG: at 15:37

## 2025-01-31 RX ADMIN — ACETAMINOPHEN 1000 MG: 500 TABLET, FILM COATED ORAL at 11:13

## 2025-01-31 RX ADMIN — ROCURONIUM BROMIDE 10 MG: 10 INJECTION INTRAVENOUS at 12:15

## 2025-01-31 RX ADMIN — MIDAZOLAM HYDROCHLORIDE 1 MG: 1 INJECTION, SOLUTION INTRAMUSCULAR; INTRAVENOUS at 11:34

## 2025-01-31 RX ADMIN — SODIUM CHLORIDE, POTASSIUM CHLORIDE, SODIUM LACTATE AND CALCIUM CHLORIDE 50 ML/HR: 600; 310; 30; 20 INJECTION, SOLUTION INTRAVENOUS at 10:41

## 2025-01-31 RX ADMIN — PROPOFOL 200 MG: 10 INJECTION, EMULSION INTRAVENOUS at 12:15

## 2025-01-31 RX ADMIN — Medication 120 MG: at 12:15

## 2025-01-31 RX ADMIN — FENTANYL CITRATE 100 MCG: 50 INJECTION, SOLUTION INTRAMUSCULAR; INTRAVENOUS at 12:15

## 2025-01-31 RX ADMIN — HYDROCODONE BITARTRATE AND ACETAMINOPHEN 1 TABLET: 5; 325 TABLET ORAL at 16:18

## 2025-01-31 RX ADMIN — CEFAZOLIN 2000 MG: 2 INJECTION, POWDER, FOR SOLUTION INTRAMUSCULAR; INTRAVENOUS at 12:20

## 2025-01-31 RX ADMIN — HYDROMORPHONE HYDROCHLORIDE 1 MG: 1 INJECTION, SOLUTION INTRAMUSCULAR; INTRAVENOUS; SUBCUTANEOUS at 15:49

## 2025-01-31 RX ADMIN — SODIUM CHLORIDE, POTASSIUM CHLORIDE, SODIUM LACTATE AND CALCIUM CHLORIDE: 600; 310; 30; 20 INJECTION, SOLUTION INTRAVENOUS at 15:16

## 2025-01-31 RX ADMIN — DEXAMETHASONE SODIUM PHOSPHATE 8 MG: 4 INJECTION, SOLUTION INTRA-ARTICULAR; INTRALESIONAL; INTRAMUSCULAR; INTRAVENOUS; SOFT TISSUE at 13:02

## 2025-01-31 RX ADMIN — FENTANYL CITRATE 100 MCG: 50 INJECTION, SOLUTION INTRAMUSCULAR; INTRAVENOUS at 12:42

## 2025-01-31 RX ADMIN — LIDOCAINE HYDROCHLORIDE 100 MG: 20 INJECTION, SOLUTION EPIDURAL; INFILTRATION; INTRACAUDAL; PERINEURAL at 12:15

## 2025-01-31 RX ADMIN — ONDANSETRON 4 MG: 2 INJECTION INTRAMUSCULAR; INTRAVENOUS at 15:37

## 2025-01-31 NOTE — DISCHARGE INSTRUCTIONS
[facility] General Surgery Discharge Instructions  Patient Name: Basil Fuentes  MRN: [id number]  YOB: 1985  Admit Date: 01/31/25      Admitting Diagnosis   Left inguinal hernia      Procedures/Surgery:   01/31/25: robotic left inguinal hernia repair with mesh      Home Medications:    Pain medication:   - Take 1000mg of tylenol every 8 hours for 3 days. After three days, take it only as needed.  - Take 200mg of ibuprofen (motrin) every 8 hours for 3 days. After three days, take it only as needed.   - You have a prescription for a narcotic. Take these only as needed after you have taken the tylenol and ibuprofen. If you are taking the roxicodone, make sure to take a stool softener (colace) with it as it can cause constipation.   - The narcotic may make you nauseated, you will have a prescription for zofran in case of nausea.       Wound Care/Drains   - you have steristrips and surgical adhesive covering your incisions; leave intact and do not peel it off. This will fall off on its own anywhere from 10days to 4 weeks.    - No swimming/soaking/bathing/wading or wearing waders for 2 weeks to allow incisions to heal.   - You may shower with steristrips and \surgical adhesive in place 48 hours after surgery.  Allow warm soapy water to run over the incisions, rinse thoroughly, do not scrub, and pat dry gently.    Activity     :   - You may slowly increase your activity after surgery beginning with walking the day of surgery, and increasing the distance slowly.  - No heavy lifting-this means no more than 15 pounds for at least 6 weeks following your operation.  Think about the pressure your abdomen feels if you hold a gallon of milk at arms length-you should feel no more pressure than this from anything you lift - if you do it is too heavy  - Do not drive, use power tools, or operate machinery (including lawn equipment) while taking pain medication    Follow-up Visits     :   - Schedule an appointment  to see Dr. Martínez in his clinic in 2 weeks.  - If you have any concerns before then, call my office at [phone] -[facility] you have concerns about bleeding, a fever greater than 102F, lightheadedness, dizziness, chest pain, shortness of breath, you should call my office as above or proceed to an emergency department closest to you for evaluation.      Weston Martínez MD  1/31/2025   15:55 CST

## 2025-01-31 NOTE — OP NOTE
GENERAL SURGERY OPERATIVE NOTE    Operative Date: 01/31/25    Operating Room Number: 6    Patient name: Basil Fuentes    Preoperative diagnosis: Left inguinal hernia    Postoperative diagnosis: Same, indirect with very large hernia sac    Procedure performed: Robotic left inguinal hernia repair with mesh    Surgeon: Weston Martínez MD    Staff:   Circulator: Ammon Bean RN  Scrub Person: Yamilex Muniz; Dash Naik; Ross Torres; Natalia Dimas    Anesthesia: General endotracheal anesthesia    Indications: This is a 39 y.o. male presenting with symptomatic left inguinal hernia.  We have recommended minimally invasive repair via the robotic platform and the patient is agreeable and elects to proceed.       Findings:   Very large left inguinal hernia with subsequent large hernia sac, unable to completely reduce sac, was able to identify and preserve the vas deferens and spermatic vessels.  Placement of an extra-large left Bard 3D max anatomic mesh 12 x 17 cm      Procedure: The patient was seen and assessed in the preoperative holding area. Signed consents were obtained after a full discussion of the risks, benefits, and alternative therapies. The patient was then transported to the operating theatre, transferred to the operating table under his own power, general anesthesia was induced, and the patient was intubated without difficulty. A timeout was performed, the patient's identity and the proposed procedure were confirmed; all present agreed and elected to proceed.    Patient abdomen pelvis were prepped and draped in the usual sterile fashion using chlorhexidine.  The abdomen was accessed via Veress needle at Sebastian's point, and initial insufflation levels were appropriately low.  The abdomen was entered with a robotic trocar and a 5 mm 30 degree scope at the same location in an Optiview fashion.  Additional robotic trocars were placed in the midline at the subxiphoid location and the patient's  right upper quadrant about a handsbreadth apart from each other.  No injury to the abdomen was noted on initial survey of the abdomen.  No right side defect was seen.  On the left, a sizable defect was seen lateral to the inferior epigastrics.  A tap block was placed under direct visualization.  A peritoneal flap was developed starting at the left anterior superior iliac spine and progressing towards the midline.  This flap was developed medially down to reveal the pubic symphysis, and then laterally, circumferentially around the hernia defect, and then laterally out far enough to accommodate an extra-large mesh.  The hernia sac itself was very large, was densely adhered to circumferentially to the defect, and deep within the patient's left hemiscrotum.  It took significantly more effort than normal, to identify and dissect free the cord structures from the hernia sac, and at the conclusion of the case, I was unable to dissect the entirety of the hernia sac from the left hemiscrotum, choosing instead to transected at a point that I felt was safe and did not compromise the cord structures.  At this point, the defect was measured, to ensure that it would accommodate an extra-large Bard 3D max anatomic mesh, and this mesh was then introduced and arranged with the central aspect of the mesh overlying the defect.  It was secured to the pubic symphysis, the posterior rectus sheath, and the posterior aspect of the transversalis fascia with this trio of 3-0 Vicryl sutures in an interrupted fashion.  The peritoneal flap was then raised, and reapproximated using a running 3 oh V-Loc suture in the typical fashion.  The abdomen was allowed to desufflate, and the trocars removed.  The skin at all 3 robotic sites was closed with a 4-0 Monocryl suture in a subcuticular fashion.  The wounds were dressed with Steri-Strips and surgical adhesive    At this point, the procedure was concluded, the patient was allowed to emerge from  anesthesia, was extubated without difficulty, and was transported to PACU in stable condition.    I was present for the entirety of the procedure.    Complications: None immediately     EBL: 25 mL    Specimens: None    Drains: None    Implants: Bard 3D max anatomic mesh left side    Dispo: PACU-anticipate discharge home following recovery from anesthesia.       Weston Martínez MD  1/31/2025   16:24 CST

## 2025-01-31 NOTE — ANESTHESIA PREPROCEDURE EVALUATION
Anesthesia Evaluation     NPO Solid Status: > 8 hours  NPO Liquid Status: > 8 hours           Airway   Mallampati: I  No difficulty expected  Dental      Pulmonary    (+) a smoker Former,  Cardiovascular   Exercise tolerance: good (4-7 METS)    (-) hypertension      Neuro/Psych  (-) seizures, TIA, CVA  GI/Hepatic/Renal/Endo    (-) liver disease, no renal disease, diabetes    Musculoskeletal     Abdominal    Substance History      OB/GYN          Other                    Anesthesia Plan    ASA 2     general     intravenous induction     Anesthetic plan, risks, benefits, and alternatives have been provided, discussed and informed consent has been obtained with: patient.    CODE STATUS:

## 2025-01-31 NOTE — ANESTHESIA PROCEDURE NOTES
Airway  Urgency: elective    Date/Time: 1/31/2025 12:16 PM  Airway not difficult    General Information and Staff    Patient location during procedure: OR  CRNA/CAA: Isaac Neves CRNA    Indications and Patient Condition  Indications for airway management: airway protection    Preoxygenated: yes  Mask difficulty assessment: 1 - vent by mask    Final Airway Details  Final airway type: endotracheal airway      Successful airway: ETT  Cuffed: yes   Successful intubation technique: direct laryngoscopy  Facilitating devices/methods: intubating stylet  Endotracheal tube insertion site: oral  Blade: Madsen  Blade size: 2  ETT size (mm): 7.5  Cormack-Lehane Classification: grade IIa - partial view of glottis  Placement verified by: capnometry   Cuff volume (mL): 7  Measured from: lips  ETT/EBT  to lips (cm): 22  Number of attempts at approach: 1  Assessment: lips, teeth, and gum same as pre-op and atraumatic intubation

## 2025-01-31 NOTE — H&P
[facility] General Surgery Consult History and Physical  Basil Fuentes  MRN: [id number]  Age: 39 y.o. male   YOB: 1985  Admit Date: 2025   Admitting Physician: Weston Martínez MD      Planned procedure: Robotic inguinal hernia repair with mesh, right, possible left, possible bilateral      SUBJECTIVE  History of Presenting Illness     Patient is a 39 y.o. male with no pertinent past medical history presenting initially for evaluation of a left groin bulge, found to have a left inguinal hernia.  This is continued, and he is here today for planned repair.  He is in no other changes in his physical condition, aside from a presumed spider bite to his right hand, for which he is on Keflex from his PCP.    Past Medical History     Past Medical History:  History reviewed. No pertinent past medical history.    PCP: ALONA Bennett MD    Past Surgical History:  Past Surgical History:   Procedure Laterality Date    WISDOM TOOTH EXTRACTION         Family History:  History reviewed. No pertinent family history.    Social History:  Social History     Tobacco Use    Smoking status: Former     Current packs/day: 0.00     Types: Cigarettes     Quit date:      Years since quittin.0    Smokeless tobacco: Former     Quit date:    Substance Use Topics    Alcohol use: Yes     Comment: occas-2 drinks per week       Allergies:  No Known Allergies    Medications:  Medications Prior to Admission   Medication Sig Dispense Refill Last Dose/Taking    cephalexin (KEFLEX) 500 MG capsule Take 1 capsule by mouth 4 (Four) Times a Day.   2025 at  9:00 AM    methylPREDNISolone (MEDROL) 4 MG dose pack Take  by mouth 2 (Two) Times a Day. Take as directed on package instructions.   2025 at  9:00 AM         Review of Systems   He denies lightheadedness, dizziness, fainting, passing out, headache, nausea, vomiting, chest pain, palpitations, shortness of breath, coughing, wheezing, heartburn,  "reflux, skin lesions, unintended weight loss/gain, sensitivity to heat/cold, changes in sensation, changes in vision/hearing/smell/taste, myalgias, arthralgias, and has no other acute complaints or concerning symptoms to report at this time.    Physical Examination     Vitals:    25 1024 25 1108   BP: 142/84    BP Location: Left arm    Patient Position: Sitting    Pulse: 81 58   Resp: 16 18   Temp: 97.9 °F (36.6 °C)    TempSrc: Temporal    SpO2: 99% 99%     Temp (24hrs), Av.9 °F (36.6 °C), Min:97.9 °F (36.6 °C), Max:97.9 °F (36.6 °C)    Systolic (36hrs), Av , Min:142 , Max:142    Diastolic (36hrs), Av, Min:84, Max:84    Estimated body mass index is 26.95 kg/m² as calculated from the following:    Height as of 25: 171 cm (67.32\").    Weight as of 25: 78.8 kg (173 lb 11.6 oz).  PREVIOUS WEIGHTS:   Wt Readings from Last 5 Encounters:   25 78.8 kg (173 lb 11.6 oz)   24 76.9 kg (169 lb 9.6 oz)   11/15/24 74.4 kg (164 lb)     Physical Examination:  Gen: awake, alert, sitting up on table in no acute distress  HEENT: NCAT, EOMI, anicteric sclerae  CV: RRR, normotensive  Resp: nonlabored on room air, sats appropriate  Abd: soft, nontender, nondistended   : Large left groin bulge which descends into the left hemiscrotum; reducible when patient is supine, has tussive impulse; small tussive impulse on the right may have weakness of ring that location  MSK: moves all four, no c/c/e  Neuro: no focal deficits, cranial nerves grossly intact  Psy:  appropriate, cooperative         Data Review   Labs:  CBC  Results from last 7 days   Lab Units 25  1243   WBC 10*3/mm3 4.31   HEMOGLOBIN g/dL 14.1   HEMATOCRIT % 43.1     BMP  Results from last 7 days   Lab Units 25  1243   SODIUM mmol/L 139   CHLORIDE mmol/L 105   CO2 mmol/L 26.0   BUN mg/dL 19   CREATININE mg/dL 0.75*   GLUCOSE mg/dL 93   CALCIUM mg/dL 9.0     LFTs  Results from last 7 days   Lab Units 25  1243   ALT " (SGPT) U/L 34   AST (SGOT) U/L 25   ALK PHOS U/L 64     Coag  Results from last 7 days   Lab Units 01/24/25  1243   APTT seconds 24.9   INR  0.93   Urine:  UA  Results from last 7 days   Lab Units 01/24/25  1243   EGFR mL/min/1.73 117.7   Radiology Impressions:  XR Chest 1 View    Result Date: 1/24/2025   1. No active disease in the chest.  This report was signed and finalized on 1/24/2025 1:18 PM by Johnny Bean.      CT Abdomen Pelvis With Contrast    Result Date: 1/16/2025   Large fat-containing left inguinal hernia measuring 6.3 x 6.6 x 15.3 cm.  This report was signed and finalized on 1/16/2025 8:45 AM by Dr. Anoop Jordan MD.       Hospital Problem List     Active Hospital Problems    Diagnosis     **Left inguinal hernia          Assessment/Plan   Basil Fuentes is a 39 y.o. male with left inguinal hernia, possible right, presenting for bilateral robotic inguinal hernia repair with mesh.    -No site marking indicated  -Preoperative antibiotics ordered-will go ahead and give Ancef, despite Keflex therapy  -to OR as above    Smoking cessation: Former smoker - counseled to continue cessation  BMI is >= 25 and <30. (Overweight) The following options were offered after discussion;: exercise counseling/recommendations and nutrition counseling/recommendations.  Was counseled to avoid weight gain.  Med rec complete: yes  VTE: VTE PPX is not indicated.  Weston Martínez MD  1/31/2025   11:35 CST

## 2025-01-31 NOTE — BRIEF OP NOTE
INGUINAL HERNIA BILATERAL REPAIR LAPAROSCOPIC WITH DAVINCI ROBOT  Progress Note    Basil Alfredo  1/31/2025    Pre-op Diagnosis:   Left inguinal hernia [K40.90]       Post-Op Diagnosis Codes:     * Left inguinal hernia [K40.90]    Procedure/CPT® Codes:  No CPT Code Applied in Case Entry      Procedure(s):  ROBOTIC LEFT INGUINAL HERNIA REPAIR WITH MESH        Surgeon(s):  Weston Martínez MD    Anesthesia: General    Staff:   Circulator: Ammon Bean RN  Scrub Person: Yamilex Muniz; Dash Naik; Ross Torres; Natalia Dimas       Estimated Blood Loss:  25mL    Urine Voided: 150 mL    Specimens:                None          Drains: * No LDAs found *    Findings: Very large left inguinal hernia with subsequent large hernia sac, unable to completely reduce sac, was able to identify and preserve the vas deferens and spermatic vessels.  Placement of an extra-large left Bard 3D max anatomic mesh 12 x 17 cm        Complications: None immediately          Weston Martínez MD     Date: 1/31/2025  Time: 15:52 CST

## 2025-02-01 NOTE — ANESTHESIA POSTPROCEDURE EVALUATION
Patient: Basil Fuentes    Procedure Summary       Date: 01/31/25 Room / Location:  [facility] OR 06 /  [facility] OR    Anesthesia Start: 1210 Anesthesia Stop: 1552    Procedure: INGUINAL HERNIA BILATERAL REPAIR LAPAROSCOPIC WITH DAVINCI ROBOT (Bilateral: Abdomen) Diagnosis:       Left inguinal hernia      (Left inguinal hernia [K40.90])    Surgeons: Weston Martínez MD Provider: Isaac Neves CRNA    Anesthesia Type: general ASA Status: 2            Anesthesia Type: general    Vitals  Vitals Value Taken Time   /87 01/31/25 1627   Temp 97.9 °F (36.6 °C) 01/31/25 1615   Pulse 64 01/31/25 1627   Resp 16 01/31/25 1627   SpO2 98 % 01/31/25 1627           Post Anesthesia Care and Evaluation      Comments: Discharged per PACU Criteria

## 2025-02-03 ENCOUNTER — TELEPHONE (OUTPATIENT)
Dept: SURGERY | Facility: CLINIC | Age: 40
End: 2025-02-03

## 2025-02-03 LAB
QT INTERVAL: 382 MS
QTC INTERVAL: 406 MS

## 2025-02-03 NOTE — TELEPHONE ENCOUNTER
Post OP phone call visit:    Type of surgery: Robotic left inguinal hernia repair with mesh.   How are you feeling? Some swelling.   Are you having any pain or Nausea? Some pain. No nausea.   Do you have a normal appetite? Yes.   Are you passing gas or having any BM? No BM. Taking stool softeners.   Urinating okay? Yes.   How is your activity? Okay.   Any drainage or fever? No redness. Bruising. Some swelling. No drainage. No fever.

## 2025-02-06 ENCOUNTER — TELEPHONE (OUTPATIENT)
Dept: SURGERY | Facility: CLINIC | Age: 40
End: 2025-02-06

## 2025-02-06 NOTE — TELEPHONE ENCOUNTER
Pt called with concerns of bruising and swelling to groin. Let patient know this is normal after inguinal hernia repair. Instructed patient to apply ice as needed, wear compression underwear, prop scrotal area up with a rolled towel, and alternate Tylenol and Ibuprofen. Pt to continue to monitor. If symptoms worsen and do not improve over time to please call the office for further instructions. Pt understood.

## 2025-02-12 NOTE — PROGRESS NOTES
"  [facility] General Surgery Clinic Progress Note  Basil Fuentes  MRN: [id number]  Age: 39 y.o. male   YOB: 1985      SUBJECTIVE  History of Presenting Illness   Patient is a 39 y.o. male returns to clinic for scheduled follow-up after robotic left inguinal hernia repair with mesh on 1/31/2025.  Overall is doing okay, but is complaining of ongoing swelling and pain of the left groin and the left hemiscrotum.  States that he feels worse than he did prior to surgery, and that this swelling there has not improved since the time of surgery.  He is eating normally, tolerating diet without nausea, vomiting, has had no fever or chills, and is having normal bowel movements without issue      Physical Examination     Vitals:    02/13/25 1346   BP: 120/77   Pulse: 98   SpO2: 98%   Weight: 78.5 kg (173 lb)   Height: 171 cm (67.32\")       Estimated body mass index is 26.84 kg/m² as calculated from the following:    Height as of this encounter: 171 cm (67.32\").    Weight as of this encounter: 78.5 kg (173 lb).  PREVIOUS WEIGHTS:   Wt Readings from Last 5 Encounters:   02/13/25 78.5 kg (173 lb)   01/24/25 78.8 kg (173 lb 11.6 oz)   12/03/24 76.9 kg (169 lb 9.6 oz)   11/15/24 74.4 kg (164 lb)       Physical Examination:  Gen: awake, alert, sitting up in chair in no acute distress  HEENT: NCAT, EOMI, anicteric sclerae  CV: RRR, normotensive  Resp: nonlabored on room air, sats appropriate  Abd: soft, nontender, nondistended no palpable mass  : Significant scarring in the left groin, extending into the left hemiscrotum with tussive impulse; significant tenderness to palpation, unable to completely reduce, tenderness abates after a few moments of attempted reduction  MSK: moves all four, no c/c/e  Neuro: no focal deficits, cranial nerves grossly intact  Psy:  appropriate, cooperative        Assessment/Plan   Basil Fuentes is a 39 y.o. male with ongoing swelling and pain after herniorrhaphy on 1/31/2025.  " While his swelling could be related to retained hematoma or seroma, I am concerned given the nature of his exam that he has an early recurrence-will send him for stat CT scan of the abdomen pelvis with IV contrast to assess, and see him in clinic next week.  Patient counseled regarding concerning signs of incarcerated hernia, of which he currently has none.  However, it is imperative we assess this for early recurrence and determine if he needs early reoperative intervention.  Follow-up with me next week CT scan.    Smoking cessation: Former smoker - counseled to continue cessation  BMI is >= 25 and <30. (Overweight) The following options were offered after discussion;: exercise counseling/recommendations and nutrition counseling/recommendations.  Was counseled to avoid weight gain.  Med rec complete: yes  VTE: VTE PPX is not indicated.      Weston Martínez MD  2/13/2025   14:22 CST

## 2025-02-13 ENCOUNTER — OFFICE VISIT (OUTPATIENT)
Dept: SURGERY | Facility: CLINIC | Age: 40
End: 2025-02-13

## 2025-02-13 VITALS
WEIGHT: 173 LBS | OXYGEN SATURATION: 98 % | HEART RATE: 98 BPM | SYSTOLIC BLOOD PRESSURE: 120 MMHG | HEIGHT: 67 IN | DIASTOLIC BLOOD PRESSURE: 77 MMHG | BODY MASS INDEX: 27.15 KG/M2

## 2025-02-13 DIAGNOSIS — K40.90 LEFT INGUINAL HERNIA: Primary | ICD-10-CM

## 2025-02-19 ENCOUNTER — HOSPITAL ENCOUNTER (OUTPATIENT)
Dept: CT IMAGING | Facility: HOSPITAL | Age: 40
Discharge: HOME | End: 2025-02-19
Admitting: STUDENT IN AN ORGANIZED HEALTH CARE EDUCATION/TRAINING PROGRAM

## 2025-02-19 ENCOUNTER — APPOINTMENT (OUTPATIENT)
Dept: ULTRASOUND IMAGING | Facility: HOSPITAL | Age: 40
End: 2025-02-19

## 2025-02-19 ENCOUNTER — HOSPITAL ENCOUNTER (EMERGENCY)
Facility: HOSPITAL | Age: 40
Discharge: HOME | End: 2025-02-19
Admitting: EMERGENCY MEDICINE

## 2025-02-19 VITALS
TEMPERATURE: 98.4 F | OXYGEN SATURATION: 100 % | WEIGHT: 173 LBS | DIASTOLIC BLOOD PRESSURE: 84 MMHG | RESPIRATION RATE: 14 BRPM | HEIGHT: 67 IN | SYSTOLIC BLOOD PRESSURE: 140 MMHG | BODY MASS INDEX: 27.15 KG/M2 | HEART RATE: 80 BPM

## 2025-02-19 DIAGNOSIS — K91.89 POSTOPERATIVE SURGICAL COMPLICATION INVOLVING DIGESTIVE SYSTEM ASSOCIATED WITH NON-DIGESTIVE SYSTEM PROCEDURE, UNSPECIFIED COMPLICATION: Primary | ICD-10-CM

## 2025-02-19 DIAGNOSIS — K40.90 LEFT INGUINAL HERNIA: ICD-10-CM

## 2025-02-19 LAB
ALBUMIN SERPL-MCNC: 4 G/DL (ref 3.5–5.2)
ALBUMIN/GLOB SERPL: 1.3 G/DL
ALP SERPL-CCNC: 96 U/L (ref 39–117)
ALT SERPL W P-5'-P-CCNC: 10 U/L (ref 1–41)
ANION GAP SERPL CALCULATED.3IONS-SCNC: 12 MMOL/L (ref 5–15)
AST SERPL-CCNC: 15 U/L (ref 1–40)
BASOPHILS # BLD AUTO: 0.06 10*3/MM3 (ref 0–0.2)
BASOPHILS NFR BLD AUTO: 0.8 % (ref 0–1.5)
BILIRUB SERPL-MCNC: 0.4 MG/DL (ref 0–1.2)
BUN SERPL-MCNC: 17 MG/DL (ref 6–20)
BUN/CREAT SERPL: 26.6 (ref 7–25)
CALCIUM SPEC-SCNC: 8.9 MG/DL (ref 8.6–10.5)
CHLORIDE SERPL-SCNC: 101 MMOL/L (ref 98–107)
CO2 SERPL-SCNC: 24 MMOL/L (ref 22–29)
CREAT SERPL-MCNC: 0.64 MG/DL (ref 0.76–1.27)
CRP SERPL-MCNC: 3.47 MG/DL (ref 0–0.5)
D-LACTATE SERPL-SCNC: 0.9 MMOL/L (ref 0.5–2)
DEPRECATED RDW RBC AUTO: 39 FL (ref 37–54)
EGFRCR SERPLBLD CKD-EPI 2021: 123.5 ML/MIN/1.73
EOSINOPHIL # BLD AUTO: 0.1 10*3/MM3 (ref 0–0.4)
EOSINOPHIL NFR BLD AUTO: 1.3 % (ref 0.3–6.2)
ERYTHROCYTE [DISTWIDTH] IN BLOOD BY AUTOMATED COUNT: 12.7 % (ref 12.3–15.4)
GLOBULIN UR ELPH-MCNC: 3.1 GM/DL
GLUCOSE SERPL-MCNC: 90 MG/DL (ref 65–99)
HCT VFR BLD AUTO: 37.5 % (ref 37.5–51)
HGB BLD-MCNC: 12.4 G/DL (ref 13–17.7)
IMM GRANULOCYTES # BLD AUTO: 0.02 10*3/MM3 (ref 0–0.05)
IMM GRANULOCYTES NFR BLD AUTO: 0.3 % (ref 0–0.5)
LYMPHOCYTES # BLD AUTO: 1.02 10*3/MM3 (ref 0.7–3.1)
LYMPHOCYTES NFR BLD AUTO: 13.2 % (ref 19.6–45.3)
MCH RBC QN AUTO: 28.1 PG (ref 26.6–33)
MCHC RBC AUTO-ENTMCNC: 33.1 G/DL (ref 31.5–35.7)
MCV RBC AUTO: 85 FL (ref 79–97)
MONOCYTES # BLD AUTO: 0.56 10*3/MM3 (ref 0.1–0.9)
MONOCYTES NFR BLD AUTO: 7.3 % (ref 5–12)
NEUTROPHILS NFR BLD AUTO: 5.94 10*3/MM3 (ref 1.7–7)
NEUTROPHILS NFR BLD AUTO: 77.1 % (ref 42.7–76)
NRBC BLD AUTO-RTO: 0 /100 WBC (ref 0–0.2)
PLATELET # BLD AUTO: 351 10*3/MM3 (ref 140–450)
PMV BLD AUTO: 8.9 FL (ref 6–12)
POTASSIUM SERPL-SCNC: 4.2 MMOL/L (ref 3.5–5.2)
PROCALCITONIN SERPL-MCNC: 0.04 NG/ML (ref 0–0.25)
PROT SERPL-MCNC: 7.1 G/DL (ref 6–8.5)
RBC # BLD AUTO: 4.41 10*6/MM3 (ref 4.14–5.8)
SODIUM SERPL-SCNC: 137 MMOL/L (ref 136–145)
WBC NRBC COR # BLD AUTO: 7.7 10*3/MM3 (ref 3.4–10.8)

## 2025-02-19 PROCEDURE — 99284 EMERGENCY DEPT VISIT MOD MDM: CPT

## 2025-02-19 PROCEDURE — 76870 US EXAM SCROTUM: CPT

## 2025-02-19 PROCEDURE — 84145 PROCALCITONIN (PCT): CPT | Performed by: NURSE PRACTITIONER

## 2025-02-19 PROCEDURE — 80053 COMPREHEN METABOLIC PANEL: CPT | Performed by: NURSE PRACTITIONER

## 2025-02-19 PROCEDURE — 85025 COMPLETE CBC W/AUTO DIFF WBC: CPT | Performed by: NURSE PRACTITIONER

## 2025-02-19 PROCEDURE — 83605 ASSAY OF LACTIC ACID: CPT | Performed by: NURSE PRACTITIONER

## 2025-02-19 PROCEDURE — 86140 C-REACTIVE PROTEIN: CPT | Performed by: NURSE PRACTITIONER

## 2025-02-19 PROCEDURE — 74177 CT ABD & PELVIS W/CONTRAST: CPT

## 2025-02-19 PROCEDURE — 99024 POSTOP FOLLOW-UP VISIT: CPT | Performed by: GENERAL PRACTICE

## 2025-02-19 PROCEDURE — 25510000001 IOPAMIDOL 61 % SOLUTION: Performed by: STUDENT IN AN ORGANIZED HEALTH CARE EDUCATION/TRAINING PROGRAM

## 2025-02-19 RX ORDER — IOPAMIDOL 612 MG/ML
100 INJECTION, SOLUTION INTRAVASCULAR
Status: COMPLETED | OUTPATIENT
Start: 2025-02-19 | End: 2025-02-19

## 2025-02-19 RX ADMIN — IOPAMIDOL 100 ML: 612 INJECTION, SOLUTION INTRAVENOUS at 08:58

## 2025-02-19 NOTE — ED PROVIDER NOTES
Subjective   History of Present Illness  Patient is a 39-year-old male presents the emergency department from the imaging center.  He states that he had inguinal hernia repair per Mauricio Esposito on 011325. He states that he has had problems with pain and swelling to left testicle since then.  He states that he had a CAT scan done this morning and was sent to the emergency department per the radiologist for further testing.  He states the on-call surgeon has been notified that he is here as well.    History provided by:  Patient   used: No        Review of Systems   Constitutional: Negative.    HENT: Negative.     Eyes: Negative.    Respiratory: Negative.     Cardiovascular: Negative.    Gastrointestinal: Negative.    Endocrine: Negative.    Genitourinary:         Patient is a 39-year-old male presents the emergency department from the imaging center.  He states that he had inguinal hernia repair per Mauricio Esposito on 011325. He states that he has had problems with pain and swelling to left testicle since then.  He states that he had a CAT scan done this morning and was sent to the emergency department per the radiologist for further testing.  He states the on-call surgeon has been notified that he is here as well.     Musculoskeletal: Negative.    Skin: Negative.    Allergic/Immunologic: Negative.    Neurological: Negative.    Hematological: Negative.    Psychiatric/Behavioral: Negative.     All other systems reviewed and are negative.      History reviewed. No pertinent past medical history.    No Known Allergies    Past Surgical History:   Procedure Laterality Date    INGUINAL HERNIA REPAIR Bilateral 1/31/2025    Procedure: INGUINAL HERNIA BILATERAL REPAIR LAPAROSCOPIC WITH DAVINCI ROBOT;  Surgeon: Weston Martínez MD;  Location: [facility];  Service: Robotics - DaVinci;  Laterality: Bilateral;    WISDOM TOOTH EXTRACTION         History reviewed. No pertinent family history.    Social  "History     Socioeconomic History    Marital status: Single   Tobacco Use    Smoking status: Former     Current packs/day: 0.00     Types: Cigarettes     Quit date:      Years since quittin.1     Passive exposure: Past    Smokeless tobacco: Former     Quit date:    Vaping Use    Vaping status: Some Days    Substances: Nicotine    Devices: Disposable   Substance and Sexual Activity    Alcohol use: Yes     Comment: occas-2 drinks per week    Drug use: Never    Sexual activity: Defer       Prior to Admission medications    Medication Sig Start Date End Date Taking? Authorizing Provider   cephalexin (KEFLEX) 500 MG capsule Take 1 capsule by mouth 4 (Four) Times a Day.    ProviderIesha MD   docusate sodium (COLACE) 250 MG capsule Take 1 capsule by mouth 2 (Two) Times a Day As Needed for Constipation. 25   Weston Martínez MD   methylPREDNISolone (MEDROL) 4 MG dose pack Take  by mouth 2 (Two) Times a Day. Take as directed on package instructions.    ProviderIesha MD   ondansetron ODT (ZOFRAN-ODT) 4 MG disintegrating tablet Place 1 tablet on the tongue Every 8 (Eight) Hours As Needed for Nausea or Vomiting. 25   Weston Martínez MD   oxyCODONE (ROXICODONE) 5 MG immediate release tablet Take 1 tablet by mouth Every 6 (Six) Hours As Needed for Moderate Pain. 25   Weston Martínez MD       /84 (BP Location: Left arm, Patient Position: Sitting)   Pulse 80   Temp 98.4 °F (36.9 °C) (Oral)   Resp 14   Ht 170.9 cm (67.3\")   Wt 78.5 kg (173 lb)   SpO2 100%   BMI 26.85 kg/m²     Objective   Physical Exam  Vitals and nursing note reviewed.   Constitutional:       Appearance: He is well-developed.      Comments: Non toxic appearing.    HENT:      Head: Normocephalic and atraumatic.   Eyes:      Conjunctiva/sclera: Conjunctivae normal.      Pupils: Pupils are equal, round, and reactive to light.   Cardiovascular:      Rate and Rhythm: Normal rate and regular rhythm. "      Heart sounds: Normal heart sounds.   Pulmonary:      Effort: Pulmonary effort is normal.      Breath sounds: Normal breath sounds.   Abdominal:      General: Bowel sounds are normal.      Palpations: Abdomen is soft.      Comments: Abdomen soft, non distended, nontender, there is moderate soft tissue swelling noted to left inguinal area and left testicle. Indurated. Mild eryth noted.    Genitourinary:     Comments: Moderate swelling noted to left testicle and left inguinal area. There is induration noted. No fluctuant abscess noted. Unable to palpate testicle due to amt of soft tissue swelling. There is no skin breakdown.   Musculoskeletal:         General: Normal range of motion.      Cervical back: Normal range of motion and neck supple.   Skin:     General: Skin is warm and dry.   Neurological:      Mental Status: He is alert and oriented to person, place, and time.      Deep Tendon Reflexes: Reflexes are normal and symmetric.   Psychiatric:         Behavior: Behavior normal.         Thought Content: Thought content normal.         Judgment: Judgment normal.         Procedures         Lab Results (last 24 hours)       Procedure Component Value Units Date/Time    CBC & Differential [[id number]]  (Abnormal) Collected: 02/19/25 0940    Specimen: Blood Updated: 02/19/25 0956    Narrative:      The following orders were created for panel order CBC & Differential.  Procedure                               Abnormality         Status                     ---------                               -----------         ------                     CBC Auto Differential[[id number]]        Abnormal            Final result                 Please view results for these tests on the individual orders.    Comprehensive Metabolic Panel [[id number]]  (Abnormal) Collected: 02/19/25 0940    Specimen: Blood Updated: 02/19/25 1015     Glucose 90 mg/dL      BUN 17 mg/dL      Creatinine 0.64 mg/dL      Sodium 137 mmol/L      Potassium 4.2  mmol/L      Chloride 101 mmol/L      CO2 24.0 mmol/L      Calcium 8.9 mg/dL      Total Protein 7.1 g/dL      Albumin 4.0 g/dL      ALT (SGPT) 10 U/L      AST (SGOT) 15 U/L      Alkaline Phosphatase 96 U/L      Total Bilirubin 0.4 mg/dL      Globulin 3.1 gm/dL      A/G Ratio 1.3 g/dL      BUN/Creatinine Ratio 26.6     Anion Gap 12.0 mmol/L      eGFR 123.5 mL/min/1.73     Narrative:      GFR Categories in Chronic Kidney Disease (CKD)      GFR Category          GFR (mL/min/1.73)    Interpretation  G1                     90 or greater         Normal or high (1)  G2                      60-89                Mild decrease (1)  G3a                   45-59                Mild to moderate decrease  G3b                   30-44                Moderate to severe decrease  G4                    15-29                Severe decrease  G5                    14 or less           Kidney failure          (1)In the absence of evidence of kidney disease, neither GFR category G1 or G2 fulfill the criteria for CKD.    eGFR calculation 2021 CKD-EPI creatinine equation, which does not include race as a factor    C-reactive Protein [[id number]]  (Abnormal) Collected: 02/19/25 0940    Specimen: Blood Updated: 02/19/25 1015     C-Reactive Protein 3.47 mg/dL     Lactic Acid, Plasma [[id number]]  (Normal) Collected: 02/19/25 0940    Specimen: Blood Updated: 02/19/25 1012     Lactate 0.9 mmol/L     Procalcitonin [[id number]]  (Normal) Collected: 02/19/25 0940    Specimen: Blood Updated: 02/19/25 1021     Procalcitonin 0.04 ng/mL     Narrative:      As a Marker for Sepsis (Non-Neonates):    1. <0.5 ng/mL represents a low risk of severe sepsis and/or septic shock.  2. >2 ng/mL represents a high risk of severe sepsis and/or septic shock.    As a Marker for Lower Respiratory Tract Infections that require antibiotic therapy:    PCT on Admission    Antibiotic Therapy       6-12 Hrs later    >0.5                Strongly Recommended  >0.25 - <0.5         "Recommended   0.1 - 0.25          Discouraged              Remeasure/reassess PCT  <0.1                Strongly Discouraged     Remeasure/reassess PCT    As 28 day mortality risk marker: \"Change in Procalcitonin Result\" (>80% or <=80%) if Day 0 (or Day 1) and Day 4 values are available. Refer to http://www.[facility]pct[facility]cal[facility]    Change in PCT <=80%  A decrease of PCT levels below or equal to 80% defines a positive change in PCT test result representing a higher risk for 28-day all-cause mortality of patients diagnosed with severe sepsis for septic shock.    Change in PCT >80%  A decrease of PCT levels of more than 80% defines a negative change in PCT result representing a lower risk for 28-day all-cause mortality of patients diagnosed with severe sepsis or septic shock.       CBC Auto Differential [[id number]]  (Abnormal) Collected: 02/19/25 0940    Specimen: Blood Updated: 02/19/25 0956     WBC 7.70 10*3/mm3      RBC 4.41 10*6/mm3      Hemoglobin 12.4 g/dL      Hematocrit 37.5 %      MCV 85.0 fL      MCH 28.1 pg      MCHC 33.1 g/dL      RDW 12.7 %      RDW-SD 39.0 fl      MPV 8.9 fL      Platelets 351 10*3/mm3      Neutrophil % 77.1 %      Lymphocyte % 13.2 %      Monocyte % 7.3 %      Eosinophil % 1.3 %      Basophil % 0.8 %      Immature Grans % 0.3 %      Neutrophils, Absolute 5.94 10*3/mm3      Lymphocytes, Absolute 1.02 10*3/mm3      Monocytes, Absolute 0.56 10*3/mm3      Eosinophils, Absolute 0.10 10*3/mm3      Basophils, Absolute 0.06 10*3/mm3      Immature Grans, Absolute 0.02 10*3/mm3      nRBC 0.0 /100 WBC             US Scrotum & Testicles   Final Result   1. Normal testes bilaterally with normal blood flow. No finding to   suggest testicular torsion.   2. A loculated fluid in the left lower abdomen, left iliac fossa   tracking into the left scrotum through the left inguinal canal as   detailed above. There are 2 different compartments of this abnormality   as detailed above. This may represent a " combination of postsurgical   changes, a seroma/lymphocele and/or hematoma?. Further follow-up may be   obtained.       This report was signed and finalized on 2/19/2025 10:24 AM by Dr. Carmella Walker MD.              ED Course  ED Course as of 02/19/25 1103   Wed Feb 19, 2025   0938 IMPRESSION:  1. Rim-enhancing fluid collection measuring 6.6 x 7.1 x 26.6 cm  involving the LEFT scrotum, LEFT inguinal canal, tracking along the LEFT  internal pelvic sidewall, concerning for abscess . Masslike appearance  in the scrotum, which could represent enlarged abnormal LEFT testicle,  which could be due to hematoma, abscess, or torsion. Recommend  urgent/emergent testicular ultrasound for further evaluation.     Result and recommendations were communicated by telephone to Faviola HINOJOSA and by Epic to Dr. Praneeth Chin on 2/19/2025 9:32 AM.     This report was signed and finalized on 2/19/2025 9:33 AM by Dr Jessie Paredes MD.   [CW]   1017 Spoke with radiologist upon seeing the patient and urgently ordered a testicular ultrasound after reviewing the CAT scan report.  Dr. Chin with surgery had advised radiology that he was seeing the patient in the ER as well.  Labs have been drawn as well. [CW]   1040 IMPRESSION:  1. Normal testes bilaterally with normal blood flow. No finding to  suggest testicular torsion.  2. A loculated fluid in the left lower abdomen, left iliac fossa  tracking into the left scrotum through the left inguinal canal as  detailed above. There are 2 different compartments of this abnormality  as detailed above. This may represent a combination of postsurgical  changes, a seroma/lymphocele and/or hematoma?. Further follow-up may be  obtained.  Call placed to Dr. Chin at this time  [CW]   104 Dr. Chin with general surgery has seen the pt in the er. He has reviewed the ct scan result and testicular ultrasound results. He has spoken with the pt and advised that the pt can be discharged home  to follow up in surgical clinic.  [CW]      ED Course User Index  [CW] Faviola Segura APRN        Medical Decision Making  Patient is a 39-year-old male presents the emergency department from the imaging center.  He states that he had inguinal hernia repair per Mauricio Esposito on 011325. He states that he has had problems with pain and swelling to left testicle since then.  He states that he had a CAT scan done this morning and was sent to the emergency department per the radiologist for further testing.  He states the on-call surgeon has been notified that he is here as well.  Course of treatment in the er: non toxic appearing. No acute distress. Lungs cta. Cv nsr. Abdomen soft, non distended, nontender, there is moderate soft tissue swelling noted to left inguinal area and left testicle. Indurated. Mild eryth noted.   Moderate swelling noted to left testicle and left inguinal area. There is induration noted. No fluctuant abscess noted. Unable to palpate testicle due to amt of soft tissue swelling. There is no skin breakdown.   Testicular ultrasound has been ordred as well as labs.   Differential diagnosis to include but not limited to: testicular torsion; abscess; seroma; and other   IMPRESSION:  1. Rim-enhancing fluid collection measuring 6.6 x 7.1 x 26.6 cm  involving the LEFT scrotum, LEFT inguinal canal, tracking along the LEFT  internal pelvic sidewall, concerning for abscess . Masslike appearance  in the scrotum, which could represent enlarged abnormal LEFT testicle,  which could be due to hematoma, abscess, or torsion. Recommend  urgent/emergent testicular ultrasound for further evaluation.     Result and recommendations were communicated by telephone to Faviola HINOJOSA and by Epic to Dr. Praneeth Chin on 2/19/2025 9:32 AM.     This report was signed and finalized on 2/19/2025 9:33 AM by Dr Jessie Paredes MD.  Dr. Chin with general surgery has seen the pt in the er. He has reviewed the ct  scan result and testicular ultrasound results. He has spoken with the pt and advised that the pt can be discharged home to follow up in surgical clinic.         Problems Addressed:  Postoperative surgical complication involving digestive system associated with non-digestive system procedure, unspecified complication: complicated acute illness or injury    Amount and/or Complexity of Data Reviewed  Labs: ordered. Decision-making details documented in ED Course.  Radiology: ordered. Decision-making details documented in ED Course.         Final diagnoses:   Postoperative surgical complication involving digestive system associated with non-digestive system procedure, unspecified complication          Faviola Segura, ASHISH  02/19/25 1059       Faviola Segura, ASHISH  02/19/25 1103

## 2025-02-20 ENCOUNTER — TELEPHONE (OUTPATIENT)
Dept: MRI IMAGING | Facility: HOSPITAL | Age: 40
End: 2025-02-20

## 2025-02-20 ENCOUNTER — TELEPHONE (OUTPATIENT)
Dept: SURGERY | Facility: CLINIC | Age: 40
End: 2025-02-20

## 2025-02-20 NOTE — TELEPHONE ENCOUNTER
Called to let patient know Dr. Martínez reviewed his CT/US scans. No recurrence of hernia noted just some fluid collection. Advised patient to wear a jock strap. Let him know where he could purchase a jock strap. Pt understood.

## 2025-02-20 NOTE — PROGRESS NOTES
LOS: 0 days   Patient Care Team:  ALONA Bennett MD as PCP - General (Internal Medicine)    Subjective  Patient presents today after obtaining CT abdomen pelvis for assessment of inguinal hernia recurrence.    Patient is status post robotic left inguinal hernia repair with mesh on 1/31.  He was seen in clinic on 2/13 with swelling of his left groin and scrotum.  He was evaluated by  and directed to obtain CT scan.  This morning I was called with the results.  There was some concern for abscess and potential compression of testicle.  I instructed the patient to present to the emergency department for scrotal ultrasound.    On evaluation, patient states he believes the size is stable from when he was seen in clinic.  He has continued pain in that area which he describes more as discomfort.  He is currently off all pain medications.  Patient is mostly worried that there is a recurrence and that he would need an additional surgery.    Objective:   Vital Signs  Temp:  [98.4 °F (36.9 °C)] 98.4 °F (36.9 °C)  Heart Rate:  [80] 80  Resp:  [14] 14  BP: (140)/(84) 140/84    Intake & Output (last 3 days)       None            Physical Exam:     General Appearance:    Alert, cooperative, in no acute distress   HEENT:   Normocephalic, atraumatic, EOMI, MMM   Lungs:     Equal chest rise bilaterally.  No increased work of breathing    Heart:    Regular rhythm and normal rate   Abdomen:    Normal bowel sounds, no masses, no organomegaly, soft nontender, nondistended, wound clean and dry, no   erythema, no discharge   Left groin with large mass extending down to the scrotum.  Firm, mild tenderness palpation, unable to reduce.   Extremities:     Moves all extremities spontaneously, no peripheral edema   Results Review:     I reviewed the patient's new clinical results. Significant labs:   I reviewed the patient's new imaging results and agree with the interpretation.    Results from last 7 days   Lab Units  02/19/25  0940   WBC 10*3/mm3 7.70   HEMOGLOBIN g/dL 12.4*   HEMATOCRIT % 37.5   PLATELETS 10*3/mm3 351        Results from last 7 days   Lab Units 02/19/25  0940   SODIUM mmol/L 137   POTASSIUM mmol/L 4.2   CHLORIDE mmol/L 101   CO2 mmol/L 24.0   BUN mg/dL 17   CREATININE mg/dL 0.64*   CALCIUM mg/dL 8.9   BILIRUBIN mg/dL 0.4   ALK PHOS U/L 96   ALT (SGPT) U/L 10   AST (SGOT) U/L 15   GLUCOSE mg/dL 90       Assessment and plan:    Assessment & Plan       * No active hospital problems. *      Mr. Fuentes is a 39 y.o. male status post left robotic inguinal hernia repair on 2/13.    CT scan reviewed.  Rim-enhancing fluid collection 6.6 x 7.1 x 26 x 6 cm of the left scrotum.  On my review I think this likely represents postoperative seroma/hematoma.  He has a normal white blood cell count and no unexpected tenderness to palpation or overlying erythema.    Ultrasound of the scrotum showed normal testes bilaterally with normal blood flow.  Again the loculated fluid collection was noted.    I provided the patient with reassurance.  He is to follow-up with Dr. Martínez next week at his previously scheduled appointment.  I recommended heat packs/cold packs as needed for comfort.  Can trial Tylenol/Motrin if pain worsens.  He was instructed to call clinic if he develops fevers, chills, erythema around incision, pain getting worse with time, nausea or vomiting.      Praneeth Chin MD  02/19/25  18:19 CST    Part of this note may be an electronic transcription/translation of spoken language to printed text using the Dragon Dictation System.

## 2025-02-20 NOTE — TELEPHONE ENCOUNTER
I spoke with the patient regarding an incidental finding seen on a recent imaging exam.The patient verbalized understanding the Radiologist's recommendations for follow-up. Per the patient, he will call and set up a f/u appt with Dr. Martínez's office to discuss.

## 2025-02-24 DIAGNOSIS — N28.89 RENAL MASS: Primary | ICD-10-CM

## 2025-02-27 ENCOUNTER — TELEPHONE (OUTPATIENT)
Dept: SURGERY | Facility: CLINIC | Age: 40
End: 2025-02-27

## 2025-02-27 NOTE — TELEPHONE ENCOUNTER
Attempted to reach patient to check and see how he was doing. Wanted to see if the swelling in the scrotal region has improved. No answer. Voicemail full. Unable to leave a message.     Pt called back. Patient states  there is some discomfort to scrotal area but tolerable. Swelling of scrotal region seems to be about the same, maybe some better. Pt going next Wednesday for Ultrasound of renal. Will update Dr. Martínez.

## 2025-03-05 ENCOUNTER — HOSPITAL ENCOUNTER (OUTPATIENT)
Dept: ULTRASOUND IMAGING | Facility: HOSPITAL | Age: 40
Discharge: HOME | End: 2025-03-05
Admitting: STUDENT IN AN ORGANIZED HEALTH CARE EDUCATION/TRAINING PROGRAM

## 2025-03-05 DIAGNOSIS — N28.89 RENAL MASS: ICD-10-CM

## 2025-03-05 PROCEDURE — 76775 US EXAM ABDO BACK WALL LIM: CPT

## 2025-05-21 ENCOUNTER — TELEPHONE (OUTPATIENT)
Dept: INTERNAL MEDICINE | Facility: CLINIC | Age: 40
End: 2025-05-21

## (undated) DEVICE — SEAL

## (undated) DEVICE — DAVINCI: Brand: MEDLINE INDUSTRIES, INC.

## (undated) DEVICE — TIP COVER ACCESSORY

## (undated) DEVICE — MICRO HVTSA, 0.5G AND HVTSA SOURCEMARK PRODUCT CODE M1206 AND M1206-01: Brand: EXOFIN MICRO HVTSA, 0.5G

## (undated) DEVICE — SYR LUERLOK 30CC

## (undated) DEVICE — GLV SURG SENSICARE W/ALOE PF LF 7.5 STRL

## (undated) DEVICE — KT CLN CLEANOR SCPE

## (undated) DEVICE — PK POSTN TRENGUARD450 PROC

## (undated) DEVICE — 4-PORT MANIFOLD: Brand: NEPTUNE 2

## (undated) DEVICE — TBG INSUFFLATION LUER LOCK: Brand: MEDLINE INDUSTRIES, INC.

## (undated) DEVICE — SUT MNCRYL 4/0 PS2 27IN UD MCP426H

## (undated) DEVICE — ANTIBACTERIAL UNDYED BRAIDED (POLYGLACTIN 910), SYNTHETIC ABSORBABLE SUTURE: Brand: COATED VICRYL

## (undated) DEVICE — ARM DRAPE

## (undated) DEVICE — SYR LL TP 10ML STRL

## (undated) DEVICE — BLADELESS OBTURATOR: Brand: WECK VISTA

## (undated) DEVICE — PATIENT RETURN ELECTRODE, SINGLE-USE, CONTACT QUALITY MONITORING, ADULT, WITH 9FT CORD, FOR PATIENTS WEIGING OVER 33LBS. (15KG): Brand: MEGADYNE

## (undated) DEVICE — NEEDLE,22GX1.5",REG,BEVEL: Brand: MEDLINE